# Patient Record
Sex: MALE | Race: WHITE | NOT HISPANIC OR LATINO | URBAN - METROPOLITAN AREA
[De-identification: names, ages, dates, MRNs, and addresses within clinical notes are randomized per-mention and may not be internally consistent; named-entity substitution may affect disease eponyms.]

---

## 2020-09-01 ENCOUNTER — OFFICE (OUTPATIENT)
Dept: URBAN - METROPOLITAN AREA CLINIC 79 | Facility: CLINIC | Age: 60
End: 2020-09-01
Payer: MEDICAID

## 2020-09-01 VITALS
DIASTOLIC BLOOD PRESSURE: 80 MMHG | HEIGHT: 70 IN | SYSTOLIC BLOOD PRESSURE: 119 MMHG | TEMPERATURE: 97.5 F | HEART RATE: 74 BPM | WEIGHT: 315 LBS

## 2020-09-01 DIAGNOSIS — Z12.11 ENCOUNTER FOR SCREENING FOR MALIGNANT NEOPLASM OF COLON: ICD-10-CM

## 2020-09-01 DIAGNOSIS — G47.30 SLEEP APNEA, UNSPECIFIED: ICD-10-CM

## 2020-09-01 PROCEDURE — 99203 OFFICE O/P NEW LOW 30 MIN: CPT | Performed by: PHYSICIAN ASSISTANT

## 2020-09-01 NOTE — SERVICEHPINOTES
SRIDEVI DE LA ROSA   is a   60   year old male who is being seen in consultation at the request of   JOEL AGUAYO   for colon cancer screening. Patient reports a prior colonoscopy around age 38 but has not had further screening since then. He reports normal daily BMs. Denies blood in stools, change in bowel habits, or any GI concerns. He is currently trying to lose weight on more of a keto diet. He recently had evaluation by cardiology and others due to significant LE edema. Was ultimately diagnosed with lymphedema. PCP notes mention aortic ectasia (thoracic). Patient denies h/o CAD or CHF. No chest pain or SOB.